# Patient Record
(demographics unavailable — no encounter records)

---

## 2025-01-15 NOTE — REASON FOR VISIT
[Follow-Up: _____] : a [unfilled] follow-up visit [FreeTextEntry1] : The patient neck pain with radiation into his shoulders.

## 2025-01-15 NOTE — HISTORY OF PRESENT ILLNESS
[FreeTextEntry1] : LETITIA HOGUE is a 58 -year -old gentleman who underwent an L5-S1 instrumented fusion by Dr. Butler in 2004.  He did well from that surgery until 8-10 years ago when he began having low back pain.  In addition, he started to experience neck pain a few years later.  The patient returns today with complaints of significant neck pain with radiation into the shoulders which is worse on the right side. He stated that this started about 7-8 years ago but has progressed.  He has not done any recent physical therapy but has done acupuncture which he stated helped significantly; however, his insurance will no longer cover this.  The patient received an epidural injection last year which helped for a few months.  He has seen a physician at [facility], Dr. Hanna, who has prescribed him Gabapentin and Tylenol.   The patient had an MRI of the cervical spine done on 03/13/2023 at [facility] which revealed degenerative disc disease at all levels.  There is grade one spondylolisthesis at C5-C6 and paracentral osseous hypertrophic changes.  There are varying degrees of neural foraminal narrowing.

## 2025-01-15 NOTE — ASSESSMENT
[FreeTextEntry1] : Oneil Whiting is a 58- year -old gentleman with a 7-8 -year history of neck pain with radiation into his shoulders, especially on the right side.  It was recommended that the patient have cervical AP and lateral x rays to view his bony anatomy and alignment.  It was recommended that he try conservative measures of pain management and was provided with a referral for physical therapy and a referral to pain management specialist, Dr. Chano Carrion.  The patient will have a telephone appointment in 3-4 weeks to evaluate how he is feeling.

## 2025-01-15 NOTE — PHYSICAL EXAM
[General Appearance - In No Acute Distress] : in no acute distress [General Appearance - Alert] : alert [General Appearance - Well Nourished] : well nourished [General Appearance - Well Developed] : well developed [General Appearance - Well-Appearing] : healthy appearing [Oriented To Time, Place, And Person] : oriented to person, place, and time [Impaired Insight] : insight and judgment were intact [Affect] : the affect was normal [Mood] : the mood was normal [Memory Recent] : recent memory was not impaired [Memory Remote] : remote memory was not impaired [Person] : oriented to person [Place] : oriented to place [Time] : oriented to time [Short Term Intact] : short term memory intact [Remote Intact] : remote memory intact [Span Intact] : the attention span was normal [Fluency] : fluency intact [Concentration Intact] : normal concentrating ability [Comprehension] : comprehension intact [Current Events] : adequate knowledge of current events [Past History] : adequate knowledge of personal past history [Vocabulary] : adequate range of vocabulary [Cranial Nerves Optic (II)] : visual acuity intact bilaterally,  pupils equal round and reactive to light [Cranial Nerves Oculomotor (III)] : extraocular motion intact [Cranial Nerves Trigeminal (V)] : facial sensation intact symmetrically [Cranial Nerves Facial (VII)] : face symmetrical [Cranial Nerves Vestibulocochlear (VIII)] : hearing was intact bilaterally [Cranial Nerves Glossopharyngeal (IX)] : tongue and palate midline [Cranial Nerves Accessory (XI - Cranial And Spinal)] : head turning and shoulder shrug symmetric [Cranial Nerves Hypoglossal (XII)] : there was no tongue deviation with protrusion [Motor Tone] : muscle tone was normal in all four extremities [Motor Strength] : muscle strength was normal in all four extremities [No Muscle Atrophy] : normal bulk in all four extremities [5] : S1 toe walking 5/5 [Sensation Tactile Decrease] : light touch was intact [Abnormal Walk] : normal gait [Balance] : balance was intact [2+] : Triceps left 2+ [1+] : Patella left 1+ [0] : Ankle jerk left 0 [No Visual Abnormalities] : no visible abnormailities [Sclera] : the sclera and conjunctiva were normal [Extraocular Movements] : extraocular movements were intact [Outer Ear] : the ears and nose were normal in appearance [Hearing Threshold Finger Rub Not [place]] : hearing was normal [Examination Of The Oral Cavity] : the lips and gums were normal [Neck Appearance] : the appearance of the neck was normal [] : no respiratory distress [Past-pointing] : there was no past-pointing [Tremor] : no tremor present [___] : absent on the right [___] : absent on the left [Almeida] : Lameida's sign was not demonstrated

## 2025-01-15 NOTE — HISTORY OF PRESENT ILLNESS
[FreeTextEntry1] : LETITIA HOGUE is a 58 -year -old gentleman who underwent an L5-S1 instrumented fusion by Dr. Butler in 2004.  He did well from that surgery until 8-10 years ago when he began having low back pain.  In addition, he started to experience neck pain a few years later.  The patient returns today with complaints of significant neck pain with radiation into the shoulders which is worse on the right side. He stated that this started about 7-8 years ago but has progressed.  He has not done any recent physical therapy but has done acupuncture which he stated helped significantly; however, his insurance will no longer cover this.  The patient received an epidural injection last year which helped for a few months.  He has seen a physician at [facility], Dr. Hanna, who has prescribed him Gabapentin and Tylenol.   The patient had an MRI of the cervical spine done on 03/13/2023 at [facility] which revealed degenerative disc disease at all levels.  There is grade one spondylolisthesis at C5-C6 and paracentral osseous hypertrophic changes.  There are varying degrees of neural foraminal narrowing.

## 2025-01-15 NOTE — HISTORY OF PRESENT ILLNESS
[FreeTextEntry1] : LETITIA HOGUE is a 58 -year -old gentleman who underwent an L5-S1 instrumented fusion by Dr. Butler in 2004.  He did well from that surgery until 8-10 years ago when he began having low back pain.  In addition, he started to experience neck pain a few years later.  The patient returns today with complaints of significant neck pain with radiation into the shoulders which is worse on the right side. He stated that this started about 7-8 years ago but has progressed.  He has not done any recent physical therapy but has done acupuncture which he stated helped significantly; however, his insurance will no longer cover this.  The patient received an epidural injection last year which helped for a few months.  He has seen a physician at [facility], Dr. Hanna, who has prescribed him Gabapentin and Tylenol.   The patient had an MRI of the cervical spine done on 03/13/2023 at [facility] which revealed degenerative disc disease at all levels.  There is grade one spondylolisthesis at C5-C6 and paracentral osseous hypertrophic changes.  There are varying degrees of neural foraminal narrowing.

## 2025-01-15 NOTE — PHYSICAL EXAM
[General Appearance - In No Acute Distress] : in no acute distress [General Appearance - Alert] : alert [General Appearance - Well Nourished] : well nourished [General Appearance - Well Developed] : well developed [General Appearance - Well-Appearing] : healthy appearing [Oriented To Time, Place, And Person] : oriented to person, place, and time [Impaired Insight] : insight and judgment were intact [Affect] : the affect was normal [Mood] : the mood was normal [Memory Recent] : recent memory was not impaired [Memory Remote] : remote memory was not impaired [Person] : oriented to person [Place] : oriented to place [Time] : oriented to time [Short Term Intact] : short term memory intact [Remote Intact] : remote memory intact [Span Intact] : the attention span was normal [Concentration Intact] : normal concentrating ability [Fluency] : fluency intact [Comprehension] : comprehension intact [Current Events] : adequate knowledge of current events [Past History] : adequate knowledge of personal past history [Vocabulary] : adequate range of vocabulary [Cranial Nerves Optic (II)] : visual acuity intact bilaterally,  pupils equal round and reactive to light [Cranial Nerves Oculomotor (III)] : extraocular motion intact [Cranial Nerves Trigeminal (V)] : facial sensation intact symmetrically [Cranial Nerves Facial (VII)] : face symmetrical [Cranial Nerves Vestibulocochlear (VIII)] : hearing was intact bilaterally [Cranial Nerves Glossopharyngeal (IX)] : tongue and palate midline [Cranial Nerves Accessory (XI - Cranial And Spinal)] : head turning and shoulder shrug symmetric [Cranial Nerves Hypoglossal (XII)] : there was no tongue deviation with protrusion [Motor Tone] : muscle tone was normal in all four extremities [Motor Strength] : muscle strength was normal in all four extremities [No Muscle Atrophy] : normal bulk in all four extremities [5] : S1 toe walking 5/5 [Sensation Tactile Decrease] : light touch was intact [Abnormal Walk] : normal gait [Balance] : balance was intact [2+] : Triceps left 2+ [1+] : Patella left 1+ [0] : Ankle jerk left 0 [No Visual Abnormalities] : no visible abnormailities [Sclera] : the sclera and conjunctiva were normal [Extraocular Movements] : extraocular movements were intact [Outer Ear] : the ears and nose were normal in appearance [Hearing Threshold Finger Rub Not [place]] : hearing was normal [Examination Of The Oral Cavity] : the lips and gums were normal [Neck Appearance] : the appearance of the neck was normal [] : no respiratory distress [Past-pointing] : there was no past-pointing [Tremor] : no tremor present [___] : absent on the right [___] : absent on the left [Almeida] : Almeida's sign was not demonstrated

## 2025-01-15 NOTE — PHYSICAL EXAM
[General Appearance - In No Acute Distress] : in no acute distress [General Appearance - Alert] : alert [General Appearance - Well Nourished] : well nourished [General Appearance - Well Developed] : well developed [General Appearance - Well-Appearing] : healthy appearing [Oriented To Time, Place, And Person] : oriented to person, place, and time [Impaired Insight] : insight and judgment were intact [Affect] : the affect was normal [Mood] : the mood was normal [Memory Recent] : recent memory was not impaired [Memory Remote] : remote memory was not impaired [Person] : oriented to person [Place] : oriented to place [Time] : oriented to time [Short Term Intact] : short term memory intact [Remote Intact] : remote memory intact [Span Intact] : the attention span was normal [Fluency] : fluency intact [Concentration Intact] : normal concentrating ability [Comprehension] : comprehension intact [Current Events] : adequate knowledge of current events [Past History] : adequate knowledge of personal past history [Vocabulary] : adequate range of vocabulary [Cranial Nerves Optic (II)] : visual acuity intact bilaterally,  pupils equal round and reactive to light [Cranial Nerves Oculomotor (III)] : extraocular motion intact [Cranial Nerves Trigeminal (V)] : facial sensation intact symmetrically [Cranial Nerves Facial (VII)] : face symmetrical [Cranial Nerves Vestibulocochlear (VIII)] : hearing was intact bilaterally [Cranial Nerves Glossopharyngeal (IX)] : tongue and palate midline [Cranial Nerves Accessory (XI - Cranial And Spinal)] : head turning and shoulder shrug symmetric [Cranial Nerves Hypoglossal (XII)] : there was no tongue deviation with protrusion [Motor Strength] : muscle strength was normal in all four extremities [Motor Tone] : muscle tone was normal in all four extremities [No Muscle Atrophy] : normal bulk in all four extremities [5] : S1 toe walking 5/5 [Sensation Tactile Decrease] : light touch was intact [Abnormal Walk] : normal gait [Balance] : balance was intact [2+] : Triceps left 2+ [1+] : Patella left 1+ [0] : Ankle jerk left 0 [No Visual Abnormalities] : no visible abnormailities [Extraocular Movements] : extraocular movements were intact [Sclera] : the sclera and conjunctiva were normal [Outer Ear] : the ears and nose were normal in appearance [Hearing Threshold Finger Rub Not [place]] : hearing was normal [Examination Of The Oral Cavity] : the lips and gums were normal [Neck Appearance] : the appearance of the neck was normal [] : no respiratory distress [Past-pointing] : there was no past-pointing [Tremor] : no tremor present [___] : absent on the right [___] : absent on the left [Almeida] : Almeida's sign was not demonstrated

## 2025-02-04 NOTE — PHYSICAL EXAM
[General Appearance - Alert] : alert [General Appearance - In No Acute Distress] : in no acute distress [] : normal voice and communication [Oriented To Time, Place, And Person] : oriented to person, place, and time [Impaired Insight] : insight and judgment were intact [Affect] : the affect was normal [Mood] : the mood was normal [Memory Recent] : recent memory was not impaired [Memory Remote] : remote memory was not impaired [FreeTextEntry1] : The neurosurgical exam is limited due to this being a telephonic appointment.

## 2025-02-04 NOTE — REASON FOR VISIT
[Follow-Up: _____] : a [unfilled] follow-up visit [Home] : at home, [unfilled] , at the time of the visit. [Medical Office: ([facility])___] : at the medical office located in  [Verbal consent obtained from patient] : the patient, [unfilled] [FreeTextEntry4] : POLY Gonzalez. [FreeTextEntry1] : The patient reports today that he has not started physical therapy due to the cold weather.  He would also like to find a physical therapy center near him.  He has also not had the cervical spine x rays done.  He does continue with neck pain with radiation into both shoulders.

## 2025-02-04 NOTE — ASSESSMENT
[FreeTextEntry1] : Oneil Whiting is a 58- year- old gentleman with a history of neck and shoulder pain.  He had an MRI of the cervical spine on 03/13/2023 at [facility] which revealed multilevel degenerative disc disease with a grade one spondylolisthesis at C5-C6 and paracentral osseous hypertrophic changes and varying degrees of foraminal narrowing.  The patient was seen on 01/14/2025 and it was recommended that he have cervical x rays and start physical therapy.  The patient has not done this as of yet.  He was provided with the phone number for [facility] physical therapy and stated that e would make an appointment.  He will call if he does not have relief of his symptoms and a new MRI of the cervical spine without contrast will be ordered.

## 2025-02-04 NOTE — HISTORY OF PRESENT ILLNESS
[FreeTextEntry1] : LETITIA HOGUE is a 58 -year -old gentleman who underwent an L5-S1 instrumented fusion by Dr. Butler in 2004.  He did well from that surgery until 8-10 years ago when he began having low back pain.  In addition, he started to experience neck pain a few years later.  The patient returns today with complaints of significant neck pain with radiation into the shoulders which is worse on the right side. He stated that this started about 7-8 years ago but has progressed.  He has not done any recent physical therapy but has done acupuncture which he stated helped significantly; however, his insurance will no longer cover this.  The patient received an epidural injection last year which helped for a few months.  He has seen a physician at [facility], Dr. Hanna, who has prescribed him Gabapentin and Tylenol.   The patient had an MRI of the cervical spine done on 03/13/2023 at [facility] which revealed degenerative disc disease at all levels.  There is grade one spondylolisthesis at C5-C6 and paracentral osseous hypertrophic changes.  There are varying degrees of neural foraminal narrowing.